# Patient Record
Sex: FEMALE | Race: WHITE
[De-identification: names, ages, dates, MRNs, and addresses within clinical notes are randomized per-mention and may not be internally consistent; named-entity substitution may affect disease eponyms.]

---

## 2018-10-05 ENCOUNTER — HOSPITAL ENCOUNTER (OUTPATIENT)
Age: 28
Discharge: HOME | End: 2018-10-05

## 2018-10-05 ENCOUNTER — HOSPITAL ENCOUNTER (OUTPATIENT)
Dept: HOSPITAL 91 - RAD | Age: 28
Discharge: HOME | End: 2018-10-05
Payer: MEDICAID

## 2018-10-05 DIAGNOSIS — R76.11: ICD-10-CM

## 2018-10-05 DIAGNOSIS — O28.4: Primary | ICD-10-CM

## 2018-10-05 PROCEDURE — 71045 X-RAY EXAM CHEST 1 VIEW: CPT

## 2018-10-14 ENCOUNTER — HOSPITAL ENCOUNTER (INPATIENT)
Age: 28
LOS: 1 days | Discharge: HOME | DRG: 832 | End: 2018-10-15

## 2018-12-12 ENCOUNTER — HOSPITAL ENCOUNTER (OUTPATIENT)
Age: 28
Discharge: HOME | End: 2018-12-12

## 2018-12-12 ENCOUNTER — HOSPITAL ENCOUNTER (OUTPATIENT)
Dept: HOSPITAL 91 - OBT | Age: 28
Discharge: HOME | End: 2018-12-12
Payer: MEDICAID

## 2018-12-12 DIAGNOSIS — Z3A.37: ICD-10-CM

## 2018-12-12 DIAGNOSIS — O36.8130: Primary | ICD-10-CM

## 2018-12-12 PROCEDURE — 76818 FETAL BIOPHYS PROFILE W/NST: CPT

## 2018-12-27 ENCOUNTER — HOSPITAL ENCOUNTER (INPATIENT)
Dept: HOSPITAL 10 - L-D | Age: 28
LOS: 7 days | Discharge: HOME | End: 2019-01-03
Attending: OBSTETRICS & GYNECOLOGY | Admitting: OBSTETRICS & GYNECOLOGY
Payer: MEDICAID

## 2018-12-27 ENCOUNTER — HOSPITAL ENCOUNTER (INPATIENT)
Dept: HOSPITAL 91 - L-D | Age: 28
LOS: 7 days | Discharge: HOME | End: 2019-01-03
Payer: MEDICAID

## 2018-12-27 VITALS
WEIGHT: 203.27 LBS | HEIGHT: 64 IN | BODY MASS INDEX: 34.7 KG/M2 | WEIGHT: 203.27 LBS | BODY MASS INDEX: 34.7 KG/M2 | HEIGHT: 64 IN

## 2018-12-27 VITALS — SYSTOLIC BLOOD PRESSURE: 120 MMHG | RESPIRATION RATE: 18 BRPM | DIASTOLIC BLOOD PRESSURE: 77 MMHG | HEART RATE: 86 BPM

## 2018-12-27 DIAGNOSIS — K80.20: ICD-10-CM

## 2018-12-27 DIAGNOSIS — G89.18: ICD-10-CM

## 2018-12-27 DIAGNOSIS — E66.9: ICD-10-CM

## 2018-12-27 DIAGNOSIS — Z3A.40: ICD-10-CM

## 2018-12-27 LAB
ADD MAN DIFF?: NO
BASOPHIL #: 0 10^3/UL (ref 0–0.1)
BASOPHILS %: 0.3 % (ref 0–2)
EOSINOPHILS #: 0 10^3/UL (ref 0–0.5)
EOSINOPHILS %: 0.6 % (ref 0–7)
HEMATOCRIT: 34.2 % (ref 37–47)
HEMOGLOBIN: 11.9 G/DL (ref 12–16)
HEPATITIS B SURFACE ANTIGEN: NEGATIVE
IMMATURE GRANS #M: 0.02 10^3/UL (ref 0–0.03)
IMMATURE GRANS % (M): 0.3 % (ref 0–0.43)
INR: 0.88
LYMPHOCYTES #: 1.5 10^3/UL (ref 0.8–2.9)
LYMPHOCYTES %: 24.1 % (ref 15–51)
MEAN CORPUSCULAR HEMOGLOBIN: 29.9 PG (ref 29–33)
MEAN CORPUSCULAR HGB CONC: 34.8 G/DL (ref 32–37)
MEAN CORPUSCULAR VOLUME: 85.9 FL (ref 82–101)
MEAN PLATELET VOLUME: 12.7 FL (ref 7.4–10.4)
MONOCYTE #: 0.5 10^3/UL (ref 0.3–0.9)
MONOCYTES %: 7.1 % (ref 0–11)
NEUTROPHIL #: 4.3 10^3/UL (ref 1.6–7.5)
NEUTROPHILS %: 67.6 % (ref 39–77)
NUCLEATED RED BLOOD CELLS #: 0 10^3/UL (ref 0–0)
NUCLEATED RED BLOOD CELLS%: 0 /100WBC (ref 0–0)
PARTIAL THROMBOPLASTIN TIME: 26.5 SEC (ref 23–35)
PLATELET COUNT: 150 10^3/UL (ref 140–415)
PROTIME: 12 SEC (ref 11.9–14.9)
PT RATIO: 0.9
RED BLOOD COUNT: 3.98 10^6/UL (ref 4.2–5.4)
RED CELL DISTRIBUTION WIDTH: 13.2 % (ref 11.5–14.5)
WHITE BLOOD COUNT: 6.3 10^3/UL (ref 4.8–10.8)

## 2018-12-27 PROCEDURE — 87340 HEPATITIS B SURFACE AG IA: CPT

## 2018-12-27 PROCEDURE — 85610 PROTHROMBIN TIME: CPT

## 2018-12-27 PROCEDURE — 76815 OB US LIMITED FETUS(S): CPT

## 2018-12-27 PROCEDURE — 86900 BLOOD TYPING SEROLOGIC ABO: CPT

## 2018-12-27 PROCEDURE — 90686 IIV4 VACC NO PRSV 0.5 ML IM: CPT

## 2018-12-27 PROCEDURE — 85025 COMPLETE CBC W/AUTO DIFF WBC: CPT

## 2018-12-27 PROCEDURE — 86850 RBC ANTIBODY SCREEN: CPT

## 2018-12-27 PROCEDURE — 85730 THROMBOPLASTIN TIME PARTIAL: CPT

## 2018-12-27 PROCEDURE — 86901 BLOOD TYPING SEROLOGIC RH(D): CPT

## 2018-12-27 PROCEDURE — 62319: CPT

## 2018-12-27 PROCEDURE — 86592 SYPHILIS TEST NON-TREP QUAL: CPT

## 2018-12-27 RX ADMIN — PYRIDOXINE HYDROCHLORIDE SCH MLS/HR: 100 INJECTION, SOLUTION INTRAMUSCULAR; INTRAVENOUS at 16:22

## 2018-12-27 RX ADMIN — PYRIDOXINE HYDROCHLORIDE 1 MLS/HR: 100 INJECTION, SOLUTION INTRAMUSCULAR; INTRAVENOUS at 22:24

## 2018-12-27 RX ADMIN — PYRIDOXINE HYDROCHLORIDE 1 MLS/HR: 100 INJECTION, SOLUTION INTRAMUSCULAR; INTRAVENOUS at 16:22

## 2018-12-27 RX ADMIN — Medication 1 MCG: at 17:54

## 2018-12-27 RX ADMIN — Medication SCH MCG: at 17:54

## 2018-12-27 RX ADMIN — PYRIDOXINE HYDROCHLORIDE SCH MLS/HR: 100 INJECTION, SOLUTION INTRAMUSCULAR; INTRAVENOUS at 22:24

## 2018-12-27 NOTE — HP
Date/Time of Note


Date/Time of Note


DATE: 18 


TIME: 17:54





OB - History


Hx of Present Pregnancy


Free Text/Dictation


28-year-old female  1 para 0 admitted for elective induction of labor at 


40 weeks


Last Menstrual Period:  Mar 22, 2018


Estimated Due Date:  Dec 27, 2018


:  1


Para:  0


Prenatal Care:  Good Care


Ultrasounds:  Normal mid trimester US


Obstetrical Complications:  None


Medical Complications:  Gastrointestinal (Gallstone)





Past Family/Social History


*


Past Medical, Surgical, Family and Obstetric Histories reviewed from prenatal 


chart.


Blood Type:  B+


Rubella:  immune


RPR/VDRL:  Negative


GBS Status:  Negative


HBsAG:  Negative





OB  Admission Exam


Vital Signs


Vital Signs





Vital Signs


  Date      Temp  Pulse  Resp  B/P (MAP)   Pulse Ox  O2          O2 Flow    FiO2


Time                                                 Delivery    Rate


  18  98.3     86    18      120/77       100  Room Air


     16:09                           (91)








Physical Exam


HEENT:  WNL


Heart:  Rhythm Normal


Lungs:  Clear, Equal


Abdomen:  WNL


Extremities:  Normal


Reflexes:  Normal


Cervical Dilatation:  None


Effacement:  0%


Station:  -3


Membranes:  Intact


Fetal Heart Rate:  140's


Accelerations:  Accelerations Present


Decelerations:  No Decelerations


Varibility:  Marked


Contractions on Admission:  None


Last 72 hours Lab Results


                                    CBC & BMP


18 15:00











OB  Assessment/Plan


Reason for admission:  induction of labor


Other Assessment:


Term gestation


Other plan:


Start induction using Cytotec











CHELSEA PENDLETON MD   Dec 27, 2018 17:56

## 2018-12-28 LAB — RAPID PLASMA REAGIN: NONREACTIVE

## 2018-12-28 RX ADMIN — PYRIDOXINE HYDROCHLORIDE 1 MLS/HR: 100 INJECTION, SOLUTION INTRAMUSCULAR; INTRAVENOUS at 10:15

## 2018-12-28 RX ADMIN — PYRIDOXINE HYDROCHLORIDE 1 MLS/HR: 100 INJECTION, SOLUTION INTRAMUSCULAR; INTRAVENOUS at 18:12

## 2018-12-28 RX ADMIN — Medication 1 MCG: at 11:44

## 2018-12-28 RX ADMIN — Medication 1 MCG: at 06:42

## 2018-12-28 RX ADMIN — PYRIDOXINE HYDROCHLORIDE SCH MLS/HR: 100 INJECTION, SOLUTION INTRAMUSCULAR; INTRAVENOUS at 10:15

## 2018-12-28 RX ADMIN — Medication SCH MCG: at 18:54

## 2018-12-28 RX ADMIN — PYRIDOXINE HYDROCHLORIDE 1 MLS/HR: 100 INJECTION, SOLUTION INTRAMUSCULAR; INTRAVENOUS at 02:52

## 2018-12-28 RX ADMIN — Medication SCH MCG: at 06:42

## 2018-12-28 RX ADMIN — PYRIDOXINE HYDROCHLORIDE SCH MLS/HR: 100 INJECTION, SOLUTION INTRAMUSCULAR; INTRAVENOUS at 02:52

## 2018-12-28 RX ADMIN — Medication 1 MCG: at 23:33

## 2018-12-28 RX ADMIN — Medication 1 MCG: at 02:08

## 2018-12-28 RX ADMIN — PYRIDOXINE HYDROCHLORIDE SCH MLS/HR: 100 INJECTION, SOLUTION INTRAMUSCULAR; INTRAVENOUS at 18:12

## 2018-12-28 RX ADMIN — Medication SCH MCG: at 11:44

## 2018-12-28 RX ADMIN — Medication SCH MCG: at 23:33

## 2018-12-28 RX ADMIN — Medication 1 MCG: at 18:54

## 2018-12-28 RX ADMIN — Medication SCH MCG: at 02:08

## 2018-12-28 NOTE — PN
Date/Time of Note


Date/Time of Note


DATE: 12/28/18 


TIME: 20:07





OB Subjective


Subjective


Subjective


Patient currently has complaint of uterine contractions


Claims to be mainly back pain





OB Objective


Objective


Objective


General physical exam vital signs are stable


Cervix is long mid position soft and 1 cm





OB  Assessment/Plan


Reason for admission:  induction of labor


Other Assessment:


Term gestation


Other plan:


Continue with Cytotec induction


Patient may need another course of Cytotec











CHELSEA PENDLETON MD   Dec 28, 2018 20:08

## 2018-12-29 RX ADMIN — PYRIDOXINE HYDROCHLORIDE SCH MLS/HR: 100 INJECTION, SOLUTION INTRAMUSCULAR; INTRAVENOUS at 00:48

## 2018-12-29 RX ADMIN — Medication 1 MCG: at 18:15

## 2018-12-29 RX ADMIN — Medication 1 MCG: at 22:22

## 2018-12-29 RX ADMIN — PYRIDOXINE HYDROCHLORIDE SCH MLS/HR: 100 INJECTION, SOLUTION INTRAMUSCULAR; INTRAVENOUS at 08:15

## 2018-12-29 RX ADMIN — Medication PRN MCG: at 18:15

## 2018-12-29 RX ADMIN — Medication PRN MCG: at 22:22

## 2018-12-29 RX ADMIN — PYRIDOXINE HYDROCHLORIDE 1 MLS/HR: 100 INJECTION, SOLUTION INTRAMUSCULAR; INTRAVENOUS at 00:48

## 2018-12-29 RX ADMIN — PYRIDOXINE HYDROCHLORIDE 1 MLS/HR: 100 INJECTION, SOLUTION INTRAMUSCULAR; INTRAVENOUS at 08:15

## 2018-12-29 NOTE — PN
Date/Time of Note


Date/Time of Note


DATE: 12/29/18 


TIME: 14:57





OB Subjective


Subjective


Subjective


No complaint of uterine contraction





OB Objective


Objective


Objective


Patient does not seem to have cervical change


Vital signs as well as physical exam were stable





OB  Assessment/Plan


Reason for admission:  induction of labor


Other Assessment:


Post term pregnancy


Other plan:


We will continue to restart induction within few hours











CHELSEA PENDLETON MD   Dec 29, 2018 14:58

## 2018-12-30 RX ADMIN — PYRIDOXINE HYDROCHLORIDE SCH MLS/HR: 100 INJECTION, SOLUTION INTRAMUSCULAR; INTRAVENOUS at 10:00

## 2018-12-30 RX ADMIN — PYRIDOXINE HYDROCHLORIDE SCH MLS/HR: 100 INJECTION, SOLUTION INTRAMUSCULAR; INTRAVENOUS at 18:30

## 2018-12-30 RX ADMIN — PYRIDOXINE HYDROCHLORIDE SCH MLS/HR: 100 INJECTION, SOLUTION INTRAMUSCULAR; INTRAVENOUS at 23:44

## 2018-12-30 RX ADMIN — BUTORPHANOL TARTRATE 1 MG: 2 INJECTION, SOLUTION INTRAMUSCULAR; INTRAVENOUS at 23:37

## 2018-12-30 RX ADMIN — Medication 1 MCG: at 02:17

## 2018-12-30 RX ADMIN — Medication SCH MCG: at 02:17

## 2018-12-30 RX ADMIN — BUTORPHANOL TARTRATE 1 MG: 2 INJECTION, SOLUTION INTRAMUSCULAR; INTRAVENOUS at 08:30

## 2018-12-30 RX ADMIN — Medication 1 MCG: at 10:06

## 2018-12-30 RX ADMIN — Medication PRN MCG: at 10:06

## 2018-12-30 RX ADMIN — PYRIDOXINE HYDROCHLORIDE 1 MLS/HR: 100 INJECTION, SOLUTION INTRAMUSCULAR; INTRAVENOUS at 02:17

## 2018-12-30 RX ADMIN — PYRIDOXINE HYDROCHLORIDE 1 MLS/HR: 100 INJECTION, SOLUTION INTRAMUSCULAR; INTRAVENOUS at 23:44

## 2018-12-30 RX ADMIN — Medication 1 MCG: at 14:34

## 2018-12-30 RX ADMIN — PYRIDOXINE HYDROCHLORIDE SCH MLS/HR: 100 INJECTION, SOLUTION INTRAMUSCULAR; INTRAVENOUS at 02:17

## 2018-12-30 RX ADMIN — Medication PRN MCG: at 06:29

## 2018-12-30 RX ADMIN — BUTORPHANOL TARTRATE PRN MG: 2 INJECTION INTRAMUSCULAR; INTRAVENOUS at 23:37

## 2018-12-30 RX ADMIN — PYRIDOXINE HYDROCHLORIDE 1 MLS/HR: 100 INJECTION, SOLUTION INTRAMUSCULAR; INTRAVENOUS at 18:30

## 2018-12-30 RX ADMIN — PYRIDOXINE HYDROCHLORIDE 1 MLS/HR: 100 INJECTION, SOLUTION INTRAMUSCULAR; INTRAVENOUS at 10:00

## 2018-12-30 RX ADMIN — Medication 1 MCG: at 06:29

## 2018-12-30 RX ADMIN — Medication PRN MCG: at 14:34

## 2018-12-30 RX ADMIN — BUTORPHANOL TARTRATE PRN MG: 2 INJECTION INTRAMUSCULAR; INTRAVENOUS at 08:30

## 2018-12-30 NOTE — PN
Date/Time of Note


Date/Time of Note


DATE: 12/30/18 


TIME: 15:28





OB Subjective


Subjective


Subjective


Currently does not have complaint of uterine contractions





OB Objective


Objective


Objective


Vital signs as well as a general physical exam are stable


Cervix is long and 1 cm with high presenting part





OB  Assessment/Plan


Reason for admission:  induction of labor


Other Assessment:


40 weeks and 3 4 days gestation


Other plan:


We will try cooks balloon after last dose of a Cytotec











CHELSEA PENDLETON MD   Dec 30, 2018 15:32

## 2018-12-31 VITALS — SYSTOLIC BLOOD PRESSURE: 123 MMHG | HEART RATE: 78 BPM | RESPIRATION RATE: 18 BRPM | DIASTOLIC BLOOD PRESSURE: 57 MMHG

## 2018-12-31 RX ADMIN — AZITHROMYCIN MONOHYDRATE 1 MLS/HR: 500 INJECTION, POWDER, LYOPHILIZED, FOR SOLUTION INTRAVENOUS at 21:23

## 2018-12-31 RX ADMIN — PYRIDOXINE HYDROCHLORIDE 1 MLS/HR: 100 INJECTION, SOLUTION INTRAMUSCULAR; INTRAVENOUS at 07:56

## 2018-12-31 RX ADMIN — PYRIDOXINE HYDROCHLORIDE SCH MLS/HR: 100 INJECTION, SOLUTION INTRAMUSCULAR; INTRAVENOUS at 07:56

## 2018-12-31 RX ADMIN — FENTANYL CIT 0.2 MG/100ML-ROPIV 0.2%-NACL 0.9% EPIDURAL INJ 1 ML: 2/0.2 SOLUTION at 15:57

## 2018-12-31 RX ADMIN — FENTANYL CIT 0.2 MG/100ML-ROPIV 0.2%-NACL 0.9% EPIDURAL INJ SCH ML: 2/0.2 SOLUTION at 15:57

## 2018-12-31 RX ADMIN — Medication 1 ML: at 18:43

## 2018-12-31 RX ADMIN — AMPICILLIN 1 MLS/HR: 2 INJECTION, POWDER, FOR SOLUTION INTRAVENOUS at 21:35

## 2018-12-31 RX ADMIN — PYRIDOXINE HYDROCHLORIDE SCH MLS/HR: 100 INJECTION, SOLUTION INTRAMUSCULAR; INTRAVENOUS at 15:16

## 2018-12-31 RX ADMIN — Medication SCH MLS/HR: at 23:08

## 2018-12-31 RX ADMIN — FENTANYL CIT 0.2 MG/100ML-ROPIV 0.2%-NACL 0.9% EPIDURAL INJ SCH ML: 2/0.2 SOLUTION at 10:52

## 2018-12-31 RX ADMIN — Medication 1 MLS/HR: at 01:34

## 2018-12-31 RX ADMIN — PYRIDOXINE HYDROCHLORIDE 1 MLS/HR: 100 INJECTION, SOLUTION INTRAMUSCULAR; INTRAVENOUS at 15:16

## 2018-12-31 RX ADMIN — Medication SCH MLS/HR: at 01:34

## 2018-12-31 RX ADMIN — Medication 1 MLS/HR: at 23:08

## 2018-12-31 RX ADMIN — CEFAZOLIN SODIUM 1 MLS/HR: 2 SOLUTION INTRAVENOUS at 18:44

## 2018-12-31 RX ADMIN — MORPHINE SULFATE 1 MG: 1 INJECTION, SOLUTION EPIDURAL; INTRATHECAL; INTRAVENOUS at 21:36

## 2018-12-31 RX ADMIN — FENTANYL CIT 0.2 MG/100ML-ROPIV 0.2%-NACL 0.9% EPIDURAL INJ 1 ML: 2/0.2 SOLUTION at 10:52

## 2018-12-31 NOTE — QN
Documentation


Comment


After entertaining the situation with the patient considering the vertex at -3 


station and vertex out of the pelvis t vaginal delivery appear to be disparate 


and remote if possible


Patient self decided to have a  section understanding complications of 


major procedures including but not limited to infection and hemorrhage











CHELSEA PENDLETON MD   Dec 31, 2018 19:41

## 2018-12-31 NOTE — OPR
Operative Report


Planned Procedure


Free Text/Dictation


28-year-old female with arrest of dilatation and descent and a desire to have a 





Procedure date


Dec 31, 2018


Procedure(s)


Primary  section


Performed by


see signature line


Assistant:  ROSAMARIA TOURE M.D.


Anesthesiologist:  BRUNA ISSA MD


Pre-procedure diagnosis


Term gestation


Arrest of dilatation and descent


                [id number]
Anesthesia Type:  [id number]
epidural








Post-Procedure


Post-procedure diagnosis


Status post 


Findings


Live Baby in OT position


Clear amniotic fluid


Normal-appearing right and left fallopian tubes and ovaries


Estimated Blood Loss:  500 - 600 mls


Specimen(s)


none


Grafts/Implant(s)


none


Complication(s)


none


Pt Condition post procedure:  stable


Disposition:  PACU


Procedure Description


Under satisfactory anaesthesia a Pfannenstiel incision was made two 


fingerbreadth above and parallel to the symphysis of pubis.


Incision was extended laterally to the border of the Recti muscles on either 


sides. Incision was carried down with sharp and blunt dissection until fascia 


was reached. Anterior Recti muscle fascia was incised in mid portion and 


incision extended laterally to the border of skin incision. Fascia was mobilized


from muscle superiorly and Recti muscles were  from midline using sharp


and blunt dissection.


Peritoneum was visualized; Avoiding bowel and bladder it was incised . Incision 


was extended superiorly and inferiorly. Bladder blade was placed. Posterior 


peritoneum covering the lower segment of the uterus and lower segment of the 


uterus were incised. Incision was extended laterally to the border of Round Lig.


on either sides and baby was delivered from OP.   position . Amniotic fluid 


appeared clear. Cord blood was obtained and cord had 3 vessels . 


Placenta was delivered spontaneously and appeared intact and complete.


Intrauterine cavity was rubbed with a laparotomy sponge. Uterine incision was 


closed in 2 layers using running stitches of No1 Monocryl. Hemostasis appeared 


secure. Ovaries and Fallopian tubes were within normal limits.


Announcing needle, lap sponge and instrument count to be correct abdomen was 


closed in layers as follows:


Peritoneum and Recti muscles with running stitches of 20 Vicryl. Fascia with 


running stitch of No 1 PDS. Subcutaneous tissue with running stitches of 20 


Chromic and skin was closed using staples. 


Patient tolerated the procedure well and was transferred to [facility] ([place] in good 


condition.











CHELSEA PENDLETON MD   Dec 31, 2018 19:45

## 2018-12-31 NOTE — PN
Date/Time of Note


Date/Time of Note


DATE: 12/31/18 


TIME: 17:44





OB Subjective


Subjective


Subjective


Currently has epidural and no labor contractions





OB Objective


Objective


Objective


Vital signs are stable and general physical exam is unchanged


Patient says stated her uterine contraction after the seeing Pitocin


Vertex still at -3 station regardless rupture membrane


No cervical change over.  Of extending over 12 hours





OB  Assessment/Plan


Reason for admission:  induction of labor


Other Assessment:


Term gestation


Other plan:


Will consult with patient 3 course of labor or delivery route CHELSEA Brandt MD   Dec 31, 2018 17:45

## 2018-12-31 NOTE — PREAC
Date/Time of Note


Date/Time of Note


DATE: 12/31/18 


TIME: 08:18





Anesthesia Eval and Record


Evaluation


Time Pre-Procedure Interview


DATE: 12/31/18 


TIME: 08:18


Age


28


Sex


female


NPO:  8 hrs


Preoperative diagnosis


Pregnancy in labor


Planned procedure


Labor epidural





Past Medical History


Past Medical History:  Includes


GI:  Obesity





Surgery & Anesthesia Issues


No known issue





Meds


Anticoagulation:  No


Beta Blocker within 24 hr:  No


Reason Beta Blocker not given:  Pt. not on B-Blocker


Active Scripts


Famotidine* (Famotidine*) 20 Mg Tablet, 20 MG PO BID, #120 TAB 1 Refill


   Prov:CHELSEA PENDLETON MD         10/15/18


Reported Medications


Prenatal Vit No.124/Iron/FA (Prenatal Vitamin Tablet) 1 Each Tablet, 1 EACH PO 


DAILY, TAB


   10/14/18





Current Medications


Lactated Ringer's 1,000 ml @  125 mls/hr Q8H IV  Last administered on 12/31/18at


07:56; Admin Dose 125 MLS/HR;  Start 12/27/18 at 15:44


Lidocaine (Xylocaine 1% (Mpf)) 30 ml ONCE PRN INJ EPISIOTOMY;  Start 12/27/18 at


16:00


Oxytocin/Lactated Ringer's 500 ml @  500 mls/hr ONCE POST PARTUM IV ;  Start 


12/27/18 at 16:00


Oxytocin/Lactated Ringer's 500 ml @  125 mls/hr POST PARTUM IV ;  Start 12/27/18


at 16:00


Ibuprofen (Motrin) 600 mg ONCE PRN PO PAIN LEVEL 1-5;  Start 12/27/18 at 16:00


Oxytocin/Lactated Ringer's 500 ml @ 0 mls/hr ONCE PRN IV VAGINAL BLEEDING;  


Start 12/27/18 at 16:00


Methylergonovine Maleate (Methergine) 0.2 mg ONCE PRN IM VAGINAL BLEEDING;  


Start 12/27/18 at 16:00


Carboprost Tromethamine (Hemabate) 250 mcg ONCE PRN IM VAGINAL BLEEDING;  Start 


12/27/18 at 16:00


Misoprostol (Cytotec) 1,000 mcg ONCE PRN FL VAGINAL BLEEDING;  Start 12/27/18 at


16:00


Misoprostol (Cytotec 50 Mcg Capsule) 50 mcg Q4H  PRN PO INDUCTION Last 


administered on 12/30/18at 14:34; Admin Dose 50 MCG;  Start 12/29/18 at 18:00


Butorphanol Tartrate (Stadol) 2 mg Q2H  PRN IV PAIN Last administered on 


12/30/18at 23:37; Admin Dose 2 MG;  Start 12/30/18 at 08:30


Oxytocin/Lactated Ringer's 500 ml @ 0 mls/hr Q0M IV  Last administered on 


12/31/18at 01:34; Admin Dose 1 MLS/HR;  Start 12/31/18 at 01:30


Meds reviewed:  Yes





Allergies


Coded Allergies:  


     No Known Allergy (Unverified , 10/14/18)


Allergies Reviewed:  Yes





Labs/Studies


Labs Reviewed:  Reviewed by anesthesiologist


Result Diagram:  


12/27/18 1500





Pregnancy test:  Positive





Pre-procedure Exam


Last vitals





Vital Signs


  Date      Temp  Pulse  Resp  B/P (MAP)   Pulse Ox  O2          O2 Flow    FiO2


Time                                                 Delivery    Rate


  12/27/18  98.3     86    18      120/77       100  Room Air


     16:09                           (91)





Airway:  Adequate mouth opening


Mallampati:  Mallampati II


Teeth:  Normal


Lung:  Normal


Heart:  Normal





ASA Physical Status


ASA physical status:  2


Emergency:  None





Planned Anesthetic


Neuraxial:  Epidural





Pre-operative Attestations


Prior to commencing anesthesia and surgery, the patient was re-evaluated, there 


was verification of:


*The patient's identity


*The results of appropriate recent lab work and preoperative vital signs


*The above evaluation not changing prior to induction


*Anesthetic plan, risk benefits, alternative and complications discussed with 


patient/family; questions answered; patient/family understands, accepts and 


wishes to proceed.











BRUNA ISSA MD               Dec 31, 2018 08:20

## 2018-12-31 NOTE — PAC
Date/Time of Note


Date/Time of Note


DATE: 12/31/18 


TIME: 20:52





Post-Anesthesia Notes


Post-Anesthesia Note


Last documented vital signs





Vital Signs


  Date      Temp  Pulse  Resp  B/P (MAP)   Pulse Ox  O2          O2 Flow    FiO2


Time                                                 Delivery    Rate


  12/27/18  98.3     86    18      120/77       100  Room Air


     16:09                           (91)





Activity:  WNL


Respiratory function:  WNL


Cardiovascular function:  WNL


Mental status:  Baseline


Pain reasonably controlled:  Yes


Hydration appropriate:  Yes


Nausea/Vomiting absent:  Yes











BRUNA ISSA MD               Dec 31, 2018 20:52

## 2019-01-01 VITALS — SYSTOLIC BLOOD PRESSURE: 131 MMHG | HEART RATE: 98 BPM | RESPIRATION RATE: 18 BRPM | DIASTOLIC BLOOD PRESSURE: 72 MMHG

## 2019-01-01 VITALS — SYSTOLIC BLOOD PRESSURE: 112 MMHG | RESPIRATION RATE: 18 BRPM | HEART RATE: 74 BPM | DIASTOLIC BLOOD PRESSURE: 71 MMHG

## 2019-01-01 VITALS — SYSTOLIC BLOOD PRESSURE: 110 MMHG | RESPIRATION RATE: 18 BRPM | HEART RATE: 72 BPM | DIASTOLIC BLOOD PRESSURE: 66 MMHG

## 2019-01-01 VITALS — HEART RATE: 76 BPM | DIASTOLIC BLOOD PRESSURE: 68 MMHG | RESPIRATION RATE: 20 BRPM | SYSTOLIC BLOOD PRESSURE: 116 MMHG

## 2019-01-01 VITALS — HEART RATE: 81 BPM | DIASTOLIC BLOOD PRESSURE: 61 MMHG | SYSTOLIC BLOOD PRESSURE: 112 MMHG | RESPIRATION RATE: 19 BRPM

## 2019-01-01 LAB
ADD MAN DIFF?: NO
BASOPHIL #: 0 10^3/UL (ref 0–0.1)
BASOPHILS %: 0.3 % (ref 0–2)
EOSINOPHILS #: 0 10^3/UL (ref 0–0.5)
EOSINOPHILS %: 0.1 % (ref 0–7)
HEMATOCRIT: 28.2 % (ref 37–47)
HEMOGLOBIN: 10 G/DL (ref 12–16)
IMMATURE GRANS #M: 0.02 10^3/UL (ref 0–0.03)
IMMATURE GRANS % (M): 0.3 % (ref 0–0.43)
LYMPHOCYTES #: 1 10^3/UL (ref 0.8–2.9)
LYMPHOCYTES %: 12.8 % (ref 15–51)
MEAN CORPUSCULAR HEMOGLOBIN: 30.8 PG (ref 29–33)
MEAN CORPUSCULAR HGB CONC: 35.5 G/DL (ref 32–37)
MEAN CORPUSCULAR VOLUME: 86.8 FL (ref 82–101)
MEAN PLATELET VOLUME: 12.9 FL (ref 7.4–10.4)
MONOCYTE #: 0.6 10^3/UL (ref 0.3–0.9)
MONOCYTES %: 7.1 % (ref 0–11)
NEUTROPHIL #: 6.2 10^3/UL (ref 1.6–7.5)
NEUTROPHILS %: 79.4 % (ref 39–77)
NUCLEATED RED BLOOD CELLS #: 0 10^3/UL (ref 0–0)
NUCLEATED RED BLOOD CELLS%: 0 /100WBC (ref 0–0)
PLATELET COUNT: 116 10^3/UL (ref 140–415)
RED BLOOD COUNT: 3.25 10^6/UL (ref 4.2–5.4)
RED CELL DISTRIBUTION WIDTH: 12.9 % (ref 11.5–14.5)
WHITE BLOOD COUNT: 7.8 10^3/UL (ref 4.8–10.8)

## 2019-01-01 RX ADMIN — MORPHINE SULFATE 1 MG: 2 INJECTION, SOLUTION INTRAMUSCULAR; INTRAVENOUS at 12:00

## 2019-01-01 RX ADMIN — KETOROLAC TROMETHAMINE PRN MG: 30 INJECTION, SOLUTION INTRAMUSCULAR at 15:19

## 2019-01-01 RX ADMIN — DOCUSATE SODIUM AND SENNOSIDES 1 TAB: 8.6; 5 TABLET, FILM COATED ORAL at 08:40

## 2019-01-01 RX ADMIN — CEFAZOLIN SODIUM 1 MLS/HR: 2 SOLUTION INTRAVENOUS at 11:23

## 2019-01-01 RX ADMIN — DOCUSATE SODIUM AND SENNOSIDES 1 TAB: 8.6; 5 TABLET, FILM COATED ORAL at 21:57

## 2019-01-01 RX ADMIN — CLINDAMYCIN HYDROCHLORIDE 1 MG: 300 CAPSULE ORAL at 13:06

## 2019-01-01 RX ADMIN — CEFAZOLIN SODIUM 1 MLS/HR: 2 SOLUTION INTRAVENOUS at 18:32

## 2019-01-01 RX ADMIN — CEFAZOLIN SODIUM 1 MLS/HR: 2 SOLUTION INTRAVENOUS at 00:26

## 2019-01-01 RX ADMIN — CLINDAMYCIN HYDROCHLORIDE 1 MG: 300 CAPSULE ORAL at 17:26

## 2019-01-01 RX ADMIN — CEFAZOLIN SODIUM 1 MLS/HR: 2 SOLUTION INTRAVENOUS at 02:55

## 2019-01-01 RX ADMIN — Medication 1 APPLIC: at 00:26

## 2019-01-01 RX ADMIN — IBUPROFEN SCH MG: 800 TABLET ORAL at 21:57

## 2019-01-01 RX ADMIN — KETOROLAC TROMETHAMINE 1 MG: 30 INJECTION, SOLUTION INTRAMUSCULAR at 06:47

## 2019-01-01 RX ADMIN — Medication 1 MG: at 15:12

## 2019-01-01 RX ADMIN — PYRIDOXINE HYDROCHLORIDE SCH MLS/HR: 100 INJECTION, SOLUTION INTRAMUSCULAR; INTRAVENOUS at 11:23

## 2019-01-01 RX ADMIN — CLINDAMYCIN HYDROCHLORIDE 1 MG: 300 CAPSULE ORAL at 06:39

## 2019-01-01 RX ADMIN — INFLUENZA A VIRUS A/MICHIGAN/45/2015 X-275 (H1N1) ANTIGEN (FORMALDEHYDE INACTIVATED), INFLUENZA A VIRUS A/HONG KONG/4801/2014 X-263B (H3N2) ANTIGEN (FORMALDEHYDE INACTIVATED), INFLUENZA B VIRUS B/PHUKET/3073/2013 ANTIGEN (FORMALDEHYDE INACTIVATED), AND INFLUENZA B VIRUS B/BRISBANE/60/2008 ANTIGEN (FORMALDEHYDE INACTIVATED) 1 ML: 15; 15; 15; 15 INJECTION, SUSPENSION INTRAMUSCULAR at 10:00

## 2019-01-01 RX ADMIN — DOCUSATE SODIUM AND SENNOSIDES SCH TAB: 8.6; 5 TABLET, FILM COATED ORAL at 08:40

## 2019-01-01 RX ADMIN — CLINDAMYCIN HYDROCHLORIDE 1 MG: 300 CAPSULE ORAL at 00:26

## 2019-01-01 RX ADMIN — IBUPROFEN 1 MG: 800 TABLET, FILM COATED ORAL at 21:57

## 2019-01-01 RX ADMIN — KETOROLAC TROMETHAMINE 1 MG: 30 INJECTION, SOLUTION INTRAMUSCULAR at 15:19

## 2019-01-01 RX ADMIN — CLINDAMYCIN HYDROCHLORIDE SCH MG: 300 CAPSULE ORAL at 06:39

## 2019-01-01 RX ADMIN — PYRIDOXINE HYDROCHLORIDE 1 MLS/HR: 100 INJECTION, SOLUTION INTRAMUSCULAR; INTRAVENOUS at 02:57

## 2019-01-01 RX ADMIN — CLINDAMYCIN HYDROCHLORIDE SCH MG: 300 CAPSULE ORAL at 17:26

## 2019-01-01 RX ADMIN — CLINDAMYCIN HYDROCHLORIDE 1 MG: 300 CAPSULE ORAL at 00:27

## 2019-01-01 RX ADMIN — KETOROLAC TROMETHAMINE PRN MG: 30 INJECTION, SOLUTION INTRAMUSCULAR at 06:47

## 2019-01-01 RX ADMIN — Medication PRN APPLIC: at 00:26

## 2019-01-01 RX ADMIN — CLINDAMYCIN HYDROCHLORIDE SCH MG: 300 CAPSULE ORAL at 13:06

## 2019-01-01 RX ADMIN — CLINDAMYCIN HYDROCHLORIDE SCH MG: 300 CAPSULE ORAL at 00:27

## 2019-01-01 RX ADMIN — PYRIDOXINE HYDROCHLORIDE 1 MLS/HR: 100 INJECTION, SOLUTION INTRAMUSCULAR; INTRAVENOUS at 11:23

## 2019-01-01 RX ADMIN — DOCUSATE SODIUM AND SENNOSIDES SCH TAB: 8.6; 5 TABLET, FILM COATED ORAL at 21:57

## 2019-01-01 NOTE — OPPN
Date/Time of Note


Date/Time of Note


DATE: 1/1/19 


TIME: 15:33





Anesthesia Follow up


Anesthesia Follow up


Last documented vital signs





Vital Signs


  Date      Temp  Pulse  Resp  B/P (MAP)   Pulse Ox  O2          O2 Flow    FiO2


Time                                                 Delivery    Rate


    1/1/19  97.9     72    18      110/66            Room Air


     11:25                           (81)


    1/1/19                                       97


     04:00





Respiratory function:  WNL


Cardiovascular function:  WNL


Comments


Postoperative pain in good control with spinal duramorph with intermittent use 


of pain medicine. No headache or other specific complaints.











BRUNA ISSA MD                Jan 1, 2019 15:36

## 2019-01-01 NOTE — NUR
EOSS Patient in stable condition, bonding well with baby, pain controlled by the medication, 
fundus firm with small amount of lochia, dorman catheter draining to clear yellow urine via 
gravity, IVF of LR at 125mm/hr, on Ancef 2 g x 3 doses, first dose given, original dressing 
dry and intact, roundings done to maintain the safety of the mom and baby and she knows how 
to use the spoon and cups to help her with breastfeeding,

## 2019-01-01 NOTE — PN
Date/Time of Note


Date/Time of Note


DATE: 1/1/19 


TIME: 14:53





Assessment/Plan


VTE Prophylaxis


VTE Prophylaxis Intervention:  ambulation





Lines/Catheters


IV Catheter Type (from Nrsg):  Peripheral IV





Assessment/Plan


Assessment/Plan


S/P C/S POD # 1 


will advance diet and ambulate


continur=e to montor VS.





Subjective


24 Hr Interval Summary


no BM


passing flatus


Constitutional:  no complaints, improved, ambulates, BM, flatus, urine output


Pain Control:  well controlled





Exam/Review of Systems


Vital Signs


Vitals





Vital Signs


  Date      Temp  Pulse  Resp  B/P (MAP)   Pulse Ox  O2          O2 Flow    FiO2


Time                                                 Delivery    Rate


    1/1/19  98.9     98    18      131/72        97  Room Air


     04:00                           (91)








Intake and Output





12/31/18 12/31/18 1/1/19





1515:00


23:00


07:00





IntakeIntake Total


1133 ml


3800 ml


1630 ml





OutputOutput Total


1350 ml


2146 ml


1550 ml





BalanceBalance


-217 ml


1654 ml


80 ml














Exam


Free Text/Dictation


abdomen: soft BS +, abdomen does not seem distended


Incision :covered


Constitutional:  alert, oriented, well developed


Psych:  no complaints, nl mood/affect


Head:  normocephalic, atraumatic


Eyes:  nl conjunctiva, EOMI, nl lids, nl sclera


ENMT:  nl external ears & nose, nl lips & teeth, nl nasal mucosa & septum, 


mucosa pink and moist


Neck:  supple, non-tender


Respiratory:  clear to auscultation, normal air movement


Cardiovascular:  regular rate and rhythm, nl pulses


Gastrointestinal:  soft, nl liver, spleen, non-tender


Musculoskeletal:  nl extremities to inspection, nl gait and stance


Extremities:  normal pulses


Neurological:  CNS II-XII intact, nl mental status, nl speech, nl strength


Skin:  nl turgor, rash or lesions


Lymph:  nl lymph nodes





Results


Result Diagram:  


1/1/19 0558














CHELSEA PENDLETON MD    Jan 1, 2019 14:56

## 2019-01-02 VITALS — SYSTOLIC BLOOD PRESSURE: 107 MMHG | HEART RATE: 60 BPM | DIASTOLIC BLOOD PRESSURE: 66 MMHG | RESPIRATION RATE: 18 BRPM

## 2019-01-02 VITALS — SYSTOLIC BLOOD PRESSURE: 114 MMHG | HEART RATE: 76 BPM | DIASTOLIC BLOOD PRESSURE: 70 MMHG | RESPIRATION RATE: 19 BRPM

## 2019-01-02 VITALS — HEART RATE: 65 BPM | SYSTOLIC BLOOD PRESSURE: 111 MMHG | DIASTOLIC BLOOD PRESSURE: 61 MMHG | RESPIRATION RATE: 19 BRPM

## 2019-01-02 VITALS — RESPIRATION RATE: 20 BRPM | HEART RATE: 75 BPM | DIASTOLIC BLOOD PRESSURE: 63 MMHG | SYSTOLIC BLOOD PRESSURE: 113 MMHG

## 2019-01-02 RX ADMIN — CEFAZOLIN SODIUM 1 MLS/HR: 2 SOLUTION INTRAVENOUS at 03:00

## 2019-01-02 RX ADMIN — ACETAMINOPHEN 1 MG: 325 TABLET, FILM COATED ORAL at 16:37

## 2019-01-02 RX ADMIN — Medication PRN APPLIC: at 20:39

## 2019-01-02 RX ADMIN — CLINDAMYCIN HYDROCHLORIDE 1 MG: 300 CAPSULE ORAL at 17:57

## 2019-01-02 RX ADMIN — CLINDAMYCIN HYDROCHLORIDE 1 MG: 300 CAPSULE ORAL at 00:39

## 2019-01-02 RX ADMIN — IBUPROFEN SCH MG: 800 TABLET ORAL at 22:33

## 2019-01-02 RX ADMIN — DOCUSATE SODIUM AND SENNOSIDES SCH TAB: 8.6; 5 TABLET, FILM COATED ORAL at 20:39

## 2019-01-02 RX ADMIN — IBUPROFEN SCH MG: 800 TABLET ORAL at 13:31

## 2019-01-02 RX ADMIN — CLINDAMYCIN HYDROCHLORIDE 1 MG: 300 CAPSULE ORAL at 23:18

## 2019-01-02 RX ADMIN — CLINDAMYCIN HYDROCHLORIDE 1 MG: 300 CAPSULE ORAL at 13:31

## 2019-01-02 RX ADMIN — IBUPROFEN 1 MG: 800 TABLET, FILM COATED ORAL at 06:06

## 2019-01-02 RX ADMIN — DOCUSATE SODIUM AND SENNOSIDES 1 TAB: 8.6; 5 TABLET, FILM COATED ORAL at 09:56

## 2019-01-02 RX ADMIN — DOCUSATE SODIUM AND SENNOSIDES 1 TAB: 8.6; 5 TABLET, FILM COATED ORAL at 20:39

## 2019-01-02 RX ADMIN — OXYCODONE HYDROCHLORIDE AND ACETAMINOPHEN 1 TAB: 5; 325 TABLET ORAL at 00:40

## 2019-01-02 RX ADMIN — IBUPROFEN SCH MG: 800 TABLET ORAL at 06:06

## 2019-01-02 RX ADMIN — CLINDAMYCIN HYDROCHLORIDE 1 MG: 300 CAPSULE ORAL at 06:06

## 2019-01-02 RX ADMIN — IBUPROFEN 1 MG: 800 TABLET, FILM COATED ORAL at 22:33

## 2019-01-02 RX ADMIN — CLINDAMYCIN HYDROCHLORIDE SCH MG: 300 CAPSULE ORAL at 06:06

## 2019-01-02 RX ADMIN — DOCUSATE SODIUM AND SENNOSIDES SCH TAB: 8.6; 5 TABLET, FILM COATED ORAL at 09:56

## 2019-01-02 RX ADMIN — CLINDAMYCIN HYDROCHLORIDE SCH MG: 300 CAPSULE ORAL at 17:57

## 2019-01-02 RX ADMIN — CLINDAMYCIN HYDROCHLORIDE SCH MG: 300 CAPSULE ORAL at 13:31

## 2019-01-02 RX ADMIN — CLINDAMYCIN HYDROCHLORIDE SCH MG: 300 CAPSULE ORAL at 23:18

## 2019-01-02 RX ADMIN — ACETAMINOPHEN 1 MG: 325 TABLET, FILM COATED ORAL at 20:39

## 2019-01-02 RX ADMIN — HYDROCODONE BITARTRATE AND ACETAMINOPHEN 1 TAB: 5; 325 TABLET ORAL at 09:56

## 2019-01-02 RX ADMIN — CLINDAMYCIN HYDROCHLORIDE SCH MG: 300 CAPSULE ORAL at 00:39

## 2019-01-02 RX ADMIN — IBUPROFEN 1 MG: 800 TABLET, FILM COATED ORAL at 13:31

## 2019-01-02 RX ADMIN — Medication 1 APPLIC: at 20:39

## 2019-01-02 NOTE — NUR
EOSS Patient in stable condition, bonding well with baby, voiding without difficulty, pain 
controlled by the medication, ambulating well, afebrile, original dressing on , had bowel 
movement, fundus firm with scant amount of lochia.

## 2019-01-02 NOTE — PD.PPDC
OB/GYN Discharge Instruction


Provider Information


Physician Information


28-year-old patient had primary  section





Diagnosis


          [id number]
Final Diagnosis:  Aynmn1y
Status post 








Condition


                 Ifyvs2Ry
Patient Condition:  Onfmb2l
Good








Diet


                [id number]
Diet:  Zlgqb2a
Resume Regular Diet








Activity/Restrictions


         [id number]
Activity:                  Nrbcs5b
May Shower


         Wutgm7Fb
Restrictions:              Mhidd0o
No Exercising


                                               No Lifting


                                               Nothing in the Vagina


         Hyxfv5Jr
Return to Work or School:  Hnqwh2j
Mar 4, 2019








Follow-up


Follow-up with Physician:  2, 3, Day/Days (In clinic for staple removal)





Return to clinic for


         Fadji4Hy
GYN Instructions:  Prqwm4x
Fever greater than 101


                                       Chills


         Ldafv8Rh
OB Instructions:   Kyhzw6p
Breast Tenderness


                                       Depression





Comment:


Pelvic rest and no hard activity for 2 months


        Wthla0Sk
Surgical Instructions:  Cswcs8t
Incisional Drainage


                                           Incisional Redness














CHELSEA PENDLETON MD    2019 14:23

## 2019-01-02 NOTE — DS
Date/Time of Note


Date/Time of Note


DATE: 19 


TIME: 14:13





Discharge Summary


Admission/Discharge Info


Admit Date/Time


Dec 27, 2018 at 14:14


Discharge Date/Time


 or January 3, 2019


Discharge Diagnosis


Status post 


Patient Condition:  Good


Procedures


Primary 


Hx of Present Illness


28-year-old female who finally underwent primary  with diagnosis of 


arrested dilatation and descent


Hospital Course


Hospital course remained uncomplicated


After  section patient tolerated diet well was ambulating without 


complications


Home Meds


Active Scripts


Famotidine* (Famotidine*) 20 Mg Tablet, 20 MG PO BID, #120 TAB 1 Refill


   Prov:CHELSEA PENDLETON MD         10/15/18


Reported Medications


Prenatal Vit No.124/Iron/FA (Prenatal Vitamin Tablet) 1 Each Tablet, 1 EACH PO 


DAILY, TAB


   10/14/18


Follow-up Plan


To 3 days in clinic for staple removal


Primary Care Provider


Not On Staff Doctor


Time spent on discharge:   > 30 minutes











CHELSEA PENDLETON MD    2019 14:16

## 2019-01-02 NOTE — DS
Date/Time of Note


Date/Time of Note


Home today or next day


DATE: 19 


TIME: 14:16





Obstetrical Discharge Record


Final Diagnosis


Final Diagnosis:  Term delivered


Other Final Diagnosis


Status post 





 Section


 Section:  Primary


Primary Indication


Arrest of dilatation and descent





Complications


Augmentation:  Yes


Induction:  Yes





Condition on Discharge


Physical Assessment


Last Vitals:


See nurse's notes


Voiding:  Yes


Bowel Movement:  Yes


Breast:  Soft, non-tender, Filling


Fundus:  Firm


Abdomen and Incision:


Abdomen is soft with present bowel sounds


Incision is without induration and or erythema


Episiotomy:


Not applicable


Calf Tenderness:  No


Patient Condition:  Good











CHELSEA PENDLETON MD    2019 14:17

## 2019-01-03 VITALS — SYSTOLIC BLOOD PRESSURE: 102 MMHG | DIASTOLIC BLOOD PRESSURE: 52 MMHG | HEART RATE: 61 BPM | RESPIRATION RATE: 20 BRPM

## 2019-01-03 VITALS — DIASTOLIC BLOOD PRESSURE: 56 MMHG | HEART RATE: 64 BPM | SYSTOLIC BLOOD PRESSURE: 108 MMHG | RESPIRATION RATE: 18 BRPM

## 2019-01-03 RX ADMIN — ACETAMINOPHEN 1 MG: 325 TABLET, FILM COATED ORAL at 08:52

## 2019-01-03 RX ADMIN — CLINDAMYCIN HYDROCHLORIDE 1 MG: 300 CAPSULE ORAL at 05:33

## 2019-01-03 RX ADMIN — ACETAMINOPHEN 1 MG: 325 TABLET, FILM COATED ORAL at 02:28

## 2019-01-03 RX ADMIN — IBUPROFEN 1 MG: 800 TABLET, FILM COATED ORAL at 05:33

## 2019-01-03 RX ADMIN — CLINDAMYCIN HYDROCHLORIDE SCH MG: 300 CAPSULE ORAL at 05:33

## 2019-01-03 RX ADMIN — IBUPROFEN SCH MG: 800 TABLET ORAL at 05:33

## 2019-01-03 RX ADMIN — DOCUSATE SODIUM AND SENNOSIDES 1 TAB: 8.6; 5 TABLET, FILM COATED ORAL at 08:52

## 2019-01-03 RX ADMIN — CLOSTRIDIUM TETANI TOXOID ANTIGEN (FORMALDEHYDE INACTIVATED), CORYNEBACTERIUM DIPHTHERIAE TOXOID ANTIGEN (FORMALDEHYDE INACTIVATED), BORDETELLA PERTUSSIS TOXOID ANTIGEN (GLUTARALDEHYDE INACTIVATED), BORDETELLA PERTUSSIS FILAMENTOUS HEMAGGLUTININ ANTIGEN (FORMALDEHYDE INACTIVATED), BORDETELLA PERTUSSIS PERTACTIN ANTIGEN, AND BORDETELLA PERTUSSIS FIMBRIAE 2/3 ANTIGEN 1 ML: 5; 2; 2.5; 5; 3; 5 INJECTION, SUSPENSION INTRAMUSCULAR at 09:00

## 2019-01-03 RX ADMIN — MEASLES, MUMPS, AND RUBELLA VIRUS VACCINE LIVE 1 ML: 1000; 12500; 1000 INJECTION, POWDER, LYOPHILIZED, FOR SUSPENSION SUBCUTANEOUS at 09:00

## 2019-01-03 RX ADMIN — DOCUSATE SODIUM AND SENNOSIDES SCH TAB: 8.6; 5 TABLET, FILM COATED ORAL at 08:52

## 2019-01-03 NOTE — NUR
Discharge instructions given to patient and her  including signs and symptoms to 
watch out for and when to call the doctor. Staple remover provided to patient to bring to 
clinic in AM as ordered by MD. Appointment has already been made to go to clinic tomorrow 
for both mom and baby. Patient verbalized understanding.